# Patient Record
Sex: MALE | Race: WHITE | NOT HISPANIC OR LATINO | URBAN - METROPOLITAN AREA
[De-identification: names, ages, dates, MRNs, and addresses within clinical notes are randomized per-mention and may not be internally consistent; named-entity substitution may affect disease eponyms.]

---

## 2019-03-07 ENCOUNTER — EMERGENCY (EMERGENCY)
Facility: HOSPITAL | Age: 34
LOS: 0 days | Discharge: HOME | End: 2019-03-07
Attending: EMERGENCY MEDICINE | Admitting: EMERGENCY MEDICINE

## 2019-03-07 VITALS
WEIGHT: 250 LBS | DIASTOLIC BLOOD PRESSURE: 82 MMHG | SYSTOLIC BLOOD PRESSURE: 142 MMHG | TEMPERATURE: 98 F | OXYGEN SATURATION: 98 % | RESPIRATION RATE: 19 BRPM | HEART RATE: 77 BPM
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
## 2019-03-07 DIAGNOSIS — S61.210A LACERATION WITHOUT FOREIGN BODY OF RIGHT INDEX FINGER WITHOUT DAMAGE TO NAIL, INITIAL ENCOUNTER: ICD-10-CM
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
## 2019-03-07 DIAGNOSIS — F17.200 NICOTINE DEPENDENCE, UNSPECIFIED, UNCOMPLICATED: ICD-10-CM
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
## 2019-03-07 NOTE — ED PROVIDER NOTE - NS ED ROS FT
Constitutional: (-) fever  Cardiovascular: (-) syncope  Integumentary: (-) rash  Neurological: (-) numbness

## 2019-03-07 NOTE — ED PROVIDER NOTE - OBJECTIVE STATEMENT
Pt is a 32yo male who cut his R 2nd finger yesterday at9AM on a router.  Got tdap at an [facility] and then referred to Dr. Hoffman.  No int erval symptoms.

## 2019-03-07 NOTE — ED PROVIDER NOTE - PHYSICAL EXAMINATION
Vital Signs: I have reviewed the initial vital signs.  Constitutional: well-nourished, appears stated age, no acute distress  Cardiovascular: 2+ radial pulse, well-perfused extremities  Respiratory: unlabored respiratory effort  MSK: FROM of finger, no swelling  Integumentary: torn tissue to base of R 2nd palmar aspect of finger  Neuro: 5/5 DIP strength at R 2nd finger  Psychiatric: appropriate mood, appropriate affect

## 2019-03-07 NOTE — ED ADULT NURSE NOTE - NSIMPLEMENTINTERV_GEN_ALL_ED
Implemented All Universal Safety Interventions:  [place] to call system. Call bell, personal items and telephone within reach. Instruct patient to call for assistance. Room bathroom lighting operational. Non-slip footwear when patient is off stretcher. Physically safe environment: no spills, clutter or unnecessary equipment. Stretcher in lowest position, wheels locked, appropriate side rails in place.

## 2019-03-07 NOTE — ED PROVIDER NOTE - CARE PROVIDER_API CALL
Todd Hoffman (DO)  Plastic Surgery  [address] [place] [address]  Phone: [phone]  Fax: [phone]  Follow Up Time: 4-6 Days

## 2019-03-07 NOTE — ED ADULT TRIAGE NOTE - CHIEF COMPLAINT QUOTE
lac to right second digit yesterday.  tetanus and augmentin given yesterday.  waiting for dr velasco

## 2021-01-26 ENCOUNTER — TRANSCRIPTION ENCOUNTER (OUTPATIENT)
Age: 36
End: 2021-01-26

## 2023-11-14 PROBLEM — Z00.00 ENCOUNTER FOR PREVENTIVE HEALTH EXAMINATION: Status: ACTIVE | Noted: 2023-11-14

## 2024-01-29 ENCOUNTER — APPOINTMENT (OUTPATIENT)
Dept: VASCULAR SURGERY | Facility: CLINIC | Age: 39
End: 2024-01-29
Payer: COMMERCIAL

## 2024-01-29 ENCOUNTER — TRANSCRIPTION ENCOUNTER (OUTPATIENT)
Age: 39
End: 2024-01-29

## 2024-01-29 VITALS — HEIGHT: 72 IN | WEIGHT: 230 LBS | BODY MASS INDEX: 31.15 KG/M2

## 2024-01-29 VITALS — DIASTOLIC BLOOD PRESSURE: 93 MMHG | HEART RATE: 74 BPM | SYSTOLIC BLOOD PRESSURE: 172 MMHG

## 2024-01-29 PROCEDURE — 93970 EXTREMITY STUDY: CPT

## 2024-01-29 PROCEDURE — 99204 OFFICE O/P NEW MOD 45 MIN: CPT

## 2024-01-29 PROCEDURE — G2211 COMPLEX E/M VISIT ADD ON: CPT

## 2024-01-29 NOTE — HISTORY OF PRESENT ILLNESS
[FreeTextEntry1] : The patient is a 38-year-old male who presents with bilateral lower extremity varicose veins.  The patient complains of heaviness to his lower extremities and fatigue.  He states his legs bother him a lot when he stands for long periods of time.  The patient has worn compression stocking therapy for the last 3 years despite stocking use he still complains of symptoms.

## 2024-01-29 NOTE — PHYSICAL EXAM
[Normal Breath Sounds] : Normal breath sounds [Normal Heart Sounds] : normal heart sounds [2+] : left 2+ [Ankle Swelling (On Exam)] : not present [Varicose Veins Of Lower Extremities] : bilaterally [Ankle Swelling On The Left] : moderate [] : not present [No HSM] : no hepatosplenomegaly [No Rash or Lesion] : No rash or lesion

## 2024-01-29 NOTE — DATA REVIEWED
[FreeTextEntry1] : Venous duplex right there is no evidence of acute deep venous thrombosis or superficial thrombophlebitis.  All major veins are patent and compressible with normal spectral Doppler waveform analysis.  The main trunk of the greater saphenous vein is negative for reflux.  There is cluster veins noted in the groin and large branch throughout the thigh with a diameter of 6.7 mm and the refill time of greater than 7 seconds.  There are also multiple incompetent varicosities noted in the calf with greatest diameter of 6.7 mm  There is no evidence of acute deep venous thrombosis superficial thrombophlebitis.  All major veins are patent and compressible with normal spectral Doppler waveform analysis.  The greater saphenous vein is incompetent refill time of 7.3 seconds.  There is a straight segment from the groin to the distal thigh.  There are cluster veins noted in the groin and distal thigh.  Also in the main trunk of the the greater saphenous vein there is a large branch in the groin to the posterior thigh at the level of the mid-thigh and diameter of 7.8 mm.

## 2024-01-29 NOTE — ASSESSMENT
[FreeTextEntry1] : The patient is a 38-year-old male with symptomatic varicose veins requiring intervention.  I performed a venous duplex that showed the saphenous veins and not amendable to endovascular laser ablation.  He will require bilateral saphenous vein ligation and multiple stab phlebectomy of his incompetent tributary veins.  I have discussed this procedure with the patient, and he is in agreement.  I explained the risk of bleeding as well as thrombosis with the patient as well.

## 2024-02-16 ENCOUNTER — APPOINTMENT (OUTPATIENT)
Dept: VASCULAR SURGERY | Facility: HOSPITAL | Age: 39
End: 2024-02-16

## 2024-02-21 ENCOUNTER — APPOINTMENT (OUTPATIENT)
Dept: VASCULAR SURGERY | Facility: CLINIC | Age: 39
End: 2024-02-21
Payer: COMMERCIAL

## 2024-02-21 VITALS
BODY MASS INDEX: 31.15 KG/M2 | HEART RATE: 74 BPM | DIASTOLIC BLOOD PRESSURE: 91 MMHG | SYSTOLIC BLOOD PRESSURE: 153 MMHG | WEIGHT: 230 LBS | HEIGHT: 72 IN

## 2024-02-21 PROCEDURE — 99214 OFFICE O/P EST MOD 30 MIN: CPT

## 2024-03-04 ENCOUNTER — APPOINTMENT (OUTPATIENT)
Dept: VASCULAR SURGERY | Facility: CLINIC | Age: 39
End: 2024-03-04

## 2024-04-09 ENCOUNTER — APPOINTMENT (OUTPATIENT)
Dept: VASCULAR SURGERY | Facility: CLINIC | Age: 39
End: 2024-04-09
Payer: COMMERCIAL

## 2024-04-09 PROCEDURE — 36475 ENDOVENOUS RF 1ST VEIN: CPT | Mod: RT

## 2024-04-09 NOTE — PROCEDURE
[FreeTextEntry1] : Right GSV ablation with RFA  [FreeTextEntry2] : Right GSV reflux  [FreeTextEntry3] : Consent was reviewed with the patient and all questions were answered.  The greater saphenous vein on the operative side was imaged with ultrasound from the distal calf to the saphenofemoral junction. The patient was placed on the table in supine position and the operative leg was prepped with chlorhexidine and draped in a sterile fashion.  The access site was anesthetized with 1 ml of 1% plain lidocaine. Access to the vein was obtained under ultrasound guidance with a 22 gauge micropuncture needle at the level of the mid-calf. This was exchanged for a 7Fr micro introducer over a 0.018in guidewire. The RF catheter was then advanced into the vein through the introducer, with the tip >1.5cm inferior to the superficial inferior epigastric vein and at least 2cm distal to the saphenofemoral junction.  The patient was placed in Trendelenburg position and tumescent anesthesia (0.05% lidocaine with epinephrine 1:100,000,000) was administered into the facial plane surrounding the vein with a 22 gauge needle. A total of {{volume\}\}cc tumescent solution was used. The vein was ablated with a 7cm heating element and then indexing the catheter forward in overlapping segments for each treatment for the length of the vein. The vein was treated to the knee level only.  External pressure was applied to the vein. Device temperature was maintained at 1205? with an initial power level of 40W dropping to below 20W for each treatment. The total length of the vein treated was {{treatment length\}\} cm. EBL was < 10cc. The patient was awake and able to answer questions during the actual ablation and denied any discomfort or paresthesia during the process.  Post procedure: The leg was then cleaned with water. A 44 dressing was applied over the access site. A graduated compression stocking was then applied.  The patient was ambulated normally in the office. Postoperative instructions were reviewed and the patient verbalized understanding. Follow up arrangements were made. The patient tolerated the procedure well and was discharged home.  Thank you for allowing me to participate in the care of your patient.   Sincerely,  Rob Kowalski MD, RPVI, FACS Associate Professor of Surgery , Vascular Fellowship Director of Limb Salvage Surgery [facility] [employer] [facility] at [facility]

## 2024-05-01 ENCOUNTER — APPOINTMENT (OUTPATIENT)
Dept: VASCULAR SURGERY | Facility: CLINIC | Age: 39
End: 2024-05-01
Payer: COMMERCIAL

## 2024-05-01 VITALS — DIASTOLIC BLOOD PRESSURE: 83 MMHG | HEART RATE: 67 BPM | SYSTOLIC BLOOD PRESSURE: 152 MMHG

## 2024-05-01 VITALS — WEIGHT: 230 LBS | HEIGHT: 72 IN | BODY MASS INDEX: 31.15 KG/M2

## 2024-05-01 PROCEDURE — 93971 EXTREMITY STUDY: CPT

## 2024-05-01 PROCEDURE — 99213 OFFICE O/P EST LOW 20 MIN: CPT

## 2024-05-01 NOTE — DATA REVIEWED
[FreeTextEntry1] : Venous duplex right the deep veins are compressible and patent status post radiofrequency ablation of the saphenous vein no evidence of deep vein thrombosis at this time the saphenous vein is intermittently closed from the groin to the mid thigh.

## 2024-05-01 NOTE — REASON FOR VISIT
[Follow-Up: _____] : a [unfilled] follow-up visit [FreeTextEntry1] : Status post left leg radiofrequency ablation of an incompetent saphenous vein

## 2024-05-01 NOTE — HISTORY OF PRESENT ILLNESS
[FreeTextEntry1] : The patient is a 38-year-old male status post right leg radiofrequency ablation of his incompetent saphenous vein.  The patient states his symptoms are overall improved on his right lower extremity.  The patient has left leg symptoms and left lower extremity greater saphenous vein incompetency.

## 2024-05-01 NOTE — ASSESSMENT
[FreeTextEntry1] : The patient is a 38-year-old male tolerated right leg radiofrequency ablation well.  His symptoms are improved in his right lower extremity.  I would like to schedule him for a left lower extremity greater saphenous vein ablation.  I spent a total of 25 minutes examining the patient discussing the test results and providing a treatment plan.   I, Dr. Kowalski, personally performed the evaluation and management (E/M) services for this established patient who presents today with (a) new problem(s)/exacerbation of (an) existing condition(s). That E/M includes conducting the clinically appropriate interval history &/or exam, assessing all new/exacerbated conditions, and establishing a new plan of care. Today, my ARVIND, Christen Vernon was here to observe my evaluation and management service for this new problem/exacerbated condition and follow the plan of care established by me going forward.  Thank you for allowing me to participate in the care of your patient.   Sincerely,   Rob Kowalski MD, RPVI, FACS Associate Professor of Surgery , Vascular Fellowship Director of Limb Salvage Surgery [facility] [employer] [facility] at [facility]

## 2024-05-28 ENCOUNTER — APPOINTMENT (OUTPATIENT)
Dept: VASCULAR SURGERY | Facility: CLINIC | Age: 39
End: 2024-05-28
Payer: COMMERCIAL

## 2024-05-28 PROCEDURE — 36475 ENDOVENOUS RF 1ST VEIN: CPT | Mod: LT

## 2024-05-29 NOTE — PROCEDURE
[FreeTextEntry1] : Left great saphenous vein ablation  [FreeTextEntry2] : Left GSV reflux [FreeTextEntry3] : Consent was reviewed with the patient and all questions were answered.  The greater saphenous vein on the operative side was imaged with ultrasound from the distal calf to the saphenofemoral junction. The patient was placed on the table in supine position and the operative leg was prepped with chlorhexidine and draped in a sterile fashion.  The access site was anesthetized with 1 ml of 1% plain lidocaine. Access to the vein was obtained under ultrasound guidance with a 22 gauge micropuncture needle at the level of the mid-calf. This was exchanged for a 7Fr micro introducer over a 0.018in guidewire. The RF catheter was then advanced into the vein through the introducer, with the tip >1.5cm inferior to the superficial inferior epigastric vein and at least 2cm distal to the saphenofemoral junction.  The patient was placed in Trendelenburg position and tumescent anesthesia (0.05% lidocaine with epinephrine 1:100,000,000) was administered into the facial plane surrounding the vein with a 22 gauge needle. A total of {{volume\}\}cc tumescent solution was used. The vein was ablated with a 7cm heating element and then indexing the catheter forward in overlapping segments for each treatment for the length of the vein. The vein was treated to the knee level only.  External pressure was applied to the vein. Device temperature was maintained at 1205? with an initial power level of 40W dropping to below 20W for each treatment. The total length of the vein treated was {{treatment length\}\} cm. EBL was < 10cc. The patient was awake and able to answer questions during the actual ablation and denied any discomfort or paresthesia during the process.  Post procedure: The leg was then cleaned with water. A 44 dressing was applied over the access site. A graduated compression stocking was then applied.  The patient was ambulated normally in the office. Postoperative instructions were reviewed and the patient verbalized understanding. Follow up arrangements were made. The patient tolerated the procedure well and was discharged home

## 2024-06-26 ENCOUNTER — APPOINTMENT (OUTPATIENT)
Dept: VASCULAR SURGERY | Facility: CLINIC | Age: 39
End: 2024-06-26
Payer: COMMERCIAL

## 2024-06-26 DIAGNOSIS — I83.893 VARICOSE VEINS OF BILATERAL LOWER EXTREMITIES WITH OTHER COMPLICATIONS: ICD-10-CM

## 2024-06-26 PROCEDURE — 99214 OFFICE O/P EST MOD 30 MIN: CPT

## 2024-08-13 ENCOUNTER — APPOINTMENT (OUTPATIENT)
Dept: OTOLARYNGOLOGY | Facility: CLINIC | Age: 39
End: 2024-08-13